# Patient Record
Sex: FEMALE | Race: WHITE | Employment: OTHER | URBAN - METROPOLITAN AREA
[De-identification: names, ages, dates, MRNs, and addresses within clinical notes are randomized per-mention and may not be internally consistent; named-entity substitution may affect disease eponyms.]

---

## 2017-01-18 PROBLEM — M48.061 SPINAL STENOSIS OF LUMBAR REGION WITH RADICULOPATHY: Status: ACTIVE | Noted: 2017-01-18

## 2017-01-18 PROBLEM — M54.16 SPINAL STENOSIS OF LUMBAR REGION WITH RADICULOPATHY: Status: ACTIVE | Noted: 2017-01-18

## 2017-03-07 PROCEDURE — 81001 URINALYSIS AUTO W/SCOPE: CPT | Performed by: INTERNAL MEDICINE

## 2017-03-07 PROCEDURE — 87081 CULTURE SCREEN ONLY: CPT | Performed by: INTERNAL MEDICINE

## 2017-03-07 PROCEDURE — 87086 URINE CULTURE/COLONY COUNT: CPT | Performed by: INTERNAL MEDICINE

## 2017-03-16 ENCOUNTER — APPOINTMENT (OUTPATIENT)
Dept: GENERAL RADIOLOGY | Facility: HOSPITAL | Age: 67
DRG: 520 | End: 2017-03-16
Attending: ORTHOPAEDIC SURGERY
Payer: MEDICARE

## 2017-03-16 ENCOUNTER — ANESTHESIA (OUTPATIENT)
Dept: SURGERY | Facility: HOSPITAL | Age: 67
DRG: 520 | End: 2017-03-16
Payer: MEDICARE

## 2017-03-16 ENCOUNTER — HOSPITAL ENCOUNTER (INPATIENT)
Facility: HOSPITAL | Age: 67
LOS: 1 days | Discharge: HOME | DRG: 520 | End: 2017-03-17
Attending: ORTHOPAEDIC SURGERY | Admitting: ORTHOPAEDIC SURGERY
Payer: MEDICARE

## 2017-03-16 ENCOUNTER — SURGERY (OUTPATIENT)
Age: 67
End: 2017-03-16

## 2017-03-16 ENCOUNTER — ANESTHESIA EVENT (OUTPATIENT)
Dept: SURGERY | Facility: HOSPITAL | Age: 67
DRG: 520 | End: 2017-03-16
Payer: MEDICARE

## 2017-03-16 DIAGNOSIS — M54.16 SPINAL STENOSIS OF LUMBAR REGION WITH RADICULOPATHY: Primary | ICD-10-CM

## 2017-03-16 DIAGNOSIS — M48.061 SPINAL STENOSIS OF LUMBAR REGION WITH RADICULOPATHY: Primary | ICD-10-CM

## 2017-03-16 LAB
ERYTHROCYTE [DISTWIDTH] IN BLOOD BY AUTOMATED COUNT: 15 % (ref 11–15)
GLUCOSE BLDC GLUCOMTR-MCNC: 116 MG/DL (ref 70–99)
GLUCOSE BLDC GLUCOMTR-MCNC: 121 MG/DL (ref 70–99)
GLUCOSE BLDC GLUCOMTR-MCNC: 124 MG/DL (ref 70–99)
GLUCOSE BLDC GLUCOMTR-MCNC: 142 MG/DL (ref 70–99)
HCT VFR BLD AUTO: 42.8 % (ref 35–48)
HGB BLD-MCNC: 14.1 G/DL (ref 12–16)
MCH RBC QN AUTO: 30 PG (ref 27–32)
MCHC RBC AUTO-ENTMCNC: 32.9 G/DL (ref 32–37)
MCV RBC AUTO: 91.3 FL (ref 80–100)
PLATELET # BLD AUTO: 210 K/UL (ref 140–400)
PMV BLD AUTO: 7.7 FL (ref 7.4–10.3)
RBC # BLD AUTO: 4.69 M/UL (ref 3.7–5.4)
WBC # BLD AUTO: 9.3 K/UL (ref 4–11)

## 2017-03-16 PROCEDURE — 72100 X-RAY EXAM L-S SPINE 2/3 VWS: CPT

## 2017-03-16 PROCEDURE — 88311 DECALCIFY TISSUE: CPT | Performed by: ORTHOPAEDIC SURGERY

## 2017-03-16 PROCEDURE — 0SB20ZZ EXCISION OF LUMBAR VERTEBRAL DISC, OPEN APPROACH: ICD-10-PCS | Performed by: ORTHOPAEDIC SURGERY

## 2017-03-16 PROCEDURE — 01NB0ZZ RELEASE LUMBAR NERVE, OPEN APPROACH: ICD-10-PCS | Performed by: ORTHOPAEDIC SURGERY

## 2017-03-16 PROCEDURE — 82962 GLUCOSE BLOOD TEST: CPT

## 2017-03-16 PROCEDURE — 85027 COMPLETE CBC AUTOMATED: CPT | Performed by: PHYSICIAN ASSISTANT

## 2017-03-16 PROCEDURE — 88304 TISSUE EXAM BY PATHOLOGIST: CPT | Performed by: ORTHOPAEDIC SURGERY

## 2017-03-16 PROCEDURE — 76001 XR OR LUMBAR SPINE (2 VIEWS) WITH >60 MIN C-ARM  (CPT=76001/72100): CPT

## 2017-03-16 DEVICE — FLOSEAL SEALENT STERILE 10ML: Type: IMPLANTABLE DEVICE | Status: FUNCTIONAL

## 2017-03-16 RX ORDER — CLINDAMYCIN PHOSPHATE 150 MG/ML
INJECTION, SOLUTION INTRAVENOUS AS NEEDED
Status: DISCONTINUED | OUTPATIENT
Start: 2017-03-16 | End: 2017-03-16 | Stop reason: SURG

## 2017-03-16 RX ORDER — 0.9 % SODIUM CHLORIDE 0.9 %
VIAL (ML) INJECTION
Status: COMPLETED
Start: 2017-03-16 | End: 2017-03-16

## 2017-03-16 RX ORDER — BISACODYL 10 MG
10 SUPPOSITORY, RECTAL RECTAL
Status: DISCONTINUED | OUTPATIENT
Start: 2017-03-16 | End: 2017-03-17

## 2017-03-16 RX ORDER — NALOXONE HYDROCHLORIDE 0.4 MG/ML
80 INJECTION, SOLUTION INTRAMUSCULAR; INTRAVENOUS; SUBCUTANEOUS AS NEEDED
Status: DISCONTINUED | OUTPATIENT
Start: 2017-03-16 | End: 2017-03-16 | Stop reason: HOSPADM

## 2017-03-16 RX ORDER — DIPHENHYDRAMINE HYDROCHLORIDE 50 MG/ML
25 INJECTION INTRAMUSCULAR; INTRAVENOUS EVERY 4 HOURS PRN
Status: DISCONTINUED | OUTPATIENT
Start: 2017-03-16 | End: 2017-03-17

## 2017-03-16 RX ORDER — HALOPERIDOL 5 MG/ML
0.25 INJECTION INTRAMUSCULAR ONCE AS NEEDED
Status: DISCONTINUED | OUTPATIENT
Start: 2017-03-16 | End: 2017-03-16 | Stop reason: HOSPADM

## 2017-03-16 RX ORDER — SODIUM CHLORIDE, SODIUM LACTATE, POTASSIUM CHLORIDE, CALCIUM CHLORIDE 600; 310; 30; 20 MG/100ML; MG/100ML; MG/100ML; MG/100ML
INJECTION, SOLUTION INTRAVENOUS CONTINUOUS
Status: DISCONTINUED | OUTPATIENT
Start: 2017-03-16 | End: 2017-03-16

## 2017-03-16 RX ORDER — HYDROCODONE BITARTRATE AND ACETAMINOPHEN 10; 325 MG/1; MG/1
1 TABLET ORAL EVERY 6 HOURS PRN
Status: DISCONTINUED | OUTPATIENT
Start: 2017-03-16 | End: 2017-03-16

## 2017-03-16 RX ORDER — HYDROMORPHONE HYDROCHLORIDE 1 MG/ML
0.6 INJECTION, SOLUTION INTRAMUSCULAR; INTRAVENOUS; SUBCUTANEOUS EVERY 5 MIN PRN
Status: DISCONTINUED | OUTPATIENT
Start: 2017-03-16 | End: 2017-03-16 | Stop reason: HOSPADM

## 2017-03-16 RX ORDER — SENNOSIDES 8.6 MG
17.2 TABLET ORAL NIGHTLY
Status: DISCONTINUED | OUTPATIENT
Start: 2017-03-16 | End: 2017-03-17

## 2017-03-16 RX ORDER — MORPHINE SULFATE 4 MG/ML
4 INJECTION, SOLUTION INTRAMUSCULAR; INTRAVENOUS EVERY 10 MIN PRN
Status: DISCONTINUED | OUTPATIENT
Start: 2017-03-16 | End: 2017-03-16 | Stop reason: HOSPADM

## 2017-03-16 RX ORDER — ONDANSETRON 2 MG/ML
4 INJECTION INTRAMUSCULAR; INTRAVENOUS ONCE AS NEEDED
Status: DISCONTINUED | OUTPATIENT
Start: 2017-03-16 | End: 2017-03-16 | Stop reason: HOSPADM

## 2017-03-16 RX ORDER — PRAVASTATIN SODIUM 20 MG
20 TABLET ORAL NIGHTLY
Status: DISCONTINUED | OUTPATIENT
Start: 2017-03-16 | End: 2017-03-17

## 2017-03-16 RX ORDER — HYDROCODONE BITARTRATE AND ACETAMINOPHEN 10; 325 MG/1; MG/1
1 TABLET ORAL EVERY 6 HOURS PRN
Status: DISCONTINUED | OUTPATIENT
Start: 2017-03-16 | End: 2017-03-17

## 2017-03-16 RX ORDER — LIDOCAINE HYDROCHLORIDE 10 MG/ML
INJECTION, SOLUTION EPIDURAL; INFILTRATION; INTRACAUDAL; PERINEURAL AS NEEDED
Status: DISCONTINUED | OUTPATIENT
Start: 2017-03-16 | End: 2017-03-16 | Stop reason: SURG

## 2017-03-16 RX ORDER — HYDROCODONE BITARTRATE AND ACETAMINOPHEN 10; 325 MG/1; MG/1
2 TABLET ORAL EVERY 6 HOURS PRN
Status: DISCONTINUED | OUTPATIENT
Start: 2017-03-16 | End: 2017-03-16

## 2017-03-16 RX ORDER — DEXAMETHASONE SODIUM PHOSPHATE 4 MG/ML
VIAL (ML) INJECTION AS NEEDED
Status: DISCONTINUED | OUTPATIENT
Start: 2017-03-16 | End: 2017-03-16 | Stop reason: SURG

## 2017-03-16 RX ORDER — HYDROMORPHONE HYDROCHLORIDE 1 MG/ML
0.3 INJECTION, SOLUTION INTRAMUSCULAR; INTRAVENOUS; SUBCUTANEOUS ONCE
Status: COMPLETED | OUTPATIENT
Start: 2017-03-16 | End: 2017-03-16

## 2017-03-16 RX ORDER — MORPHINE SULFATE 10 MG/ML
6 INJECTION, SOLUTION INTRAMUSCULAR; INTRAVENOUS EVERY 10 MIN PRN
Status: DISCONTINUED | OUTPATIENT
Start: 2017-03-16 | End: 2017-03-16 | Stop reason: HOSPADM

## 2017-03-16 RX ORDER — DIPHENHYDRAMINE HCL 25 MG
25 CAPSULE ORAL EVERY 4 HOURS PRN
Status: DISCONTINUED | OUTPATIENT
Start: 2017-03-16 | End: 2017-03-17

## 2017-03-16 RX ORDER — SODIUM PHOSPHATE, DIBASIC AND SODIUM PHOSPHATE, MONOBASIC 7; 19 G/133ML; G/133ML
1 ENEMA RECTAL ONCE AS NEEDED
Status: ACTIVE | OUTPATIENT
Start: 2017-03-16 | End: 2017-03-16

## 2017-03-16 RX ORDER — DOCUSATE SODIUM 100 MG/1
100 CAPSULE, LIQUID FILLED ORAL 2 TIMES DAILY
Status: DISCONTINUED | OUTPATIENT
Start: 2017-03-16 | End: 2017-03-17

## 2017-03-16 RX ORDER — ROCURONIUM BROMIDE 10 MG/ML
INJECTION, SOLUTION INTRAVENOUS AS NEEDED
Status: DISCONTINUED | OUTPATIENT
Start: 2017-03-16 | End: 2017-03-16 | Stop reason: SURG

## 2017-03-16 RX ORDER — SUCCINYLCHOLINE CHLORIDE 20 MG/ML
INJECTION INTRAMUSCULAR; INTRAVENOUS AS NEEDED
Status: DISCONTINUED | OUTPATIENT
Start: 2017-03-16 | End: 2017-03-16 | Stop reason: SURG

## 2017-03-16 RX ORDER — MORPHINE SULFATE 2 MG/ML
2 INJECTION, SOLUTION INTRAMUSCULAR; INTRAVENOUS EVERY 10 MIN PRN
Status: DISCONTINUED | OUTPATIENT
Start: 2017-03-16 | End: 2017-03-16 | Stop reason: HOSPADM

## 2017-03-16 RX ORDER — PHENYLEPHRINE HCL 10 MG/ML
VIAL (ML) INJECTION AS NEEDED
Status: DISCONTINUED | OUTPATIENT
Start: 2017-03-16 | End: 2017-03-16 | Stop reason: SURG

## 2017-03-16 RX ORDER — ONDANSETRON 2 MG/ML
INJECTION INTRAMUSCULAR; INTRAVENOUS AS NEEDED
Status: DISCONTINUED | OUTPATIENT
Start: 2017-03-16 | End: 2017-03-16 | Stop reason: SURG

## 2017-03-16 RX ORDER — ONDANSETRON 2 MG/ML
4 INJECTION INTRAMUSCULAR; INTRAVENOUS EVERY 4 HOURS PRN
Status: DISCONTINUED | OUTPATIENT
Start: 2017-03-16 | End: 2017-03-17

## 2017-03-16 RX ORDER — CLINDAMYCIN PHOSPHATE 900 MG/50ML
900 INJECTION INTRAVENOUS ONCE
Status: DISCONTINUED | OUTPATIENT
Start: 2017-03-16 | End: 2017-03-16 | Stop reason: HOSPADM

## 2017-03-16 RX ORDER — METOCLOPRAMIDE HYDROCHLORIDE 5 MG/ML
10 INJECTION INTRAMUSCULAR; INTRAVENOUS EVERY 6 HOURS PRN
Status: DISCONTINUED | OUTPATIENT
Start: 2017-03-16 | End: 2017-03-17

## 2017-03-16 RX ORDER — POLYETHYLENE GLYCOL 3350 17 G/17G
17 POWDER, FOR SOLUTION ORAL DAILY PRN
Status: DISCONTINUED | OUTPATIENT
Start: 2017-03-16 | End: 2017-03-17

## 2017-03-16 RX ORDER — HYDROCODONE BITARTRATE AND ACETAMINOPHEN 5; 325 MG/1; MG/1
1 TABLET ORAL AS NEEDED
Status: DISCONTINUED | OUTPATIENT
Start: 2017-03-16 | End: 2017-03-16 | Stop reason: HOSPADM

## 2017-03-16 RX ORDER — HYDROCODONE BITARTRATE AND ACETAMINOPHEN 7.5; 325 MG/1; MG/1
1 TABLET ORAL EVERY 4 HOURS PRN
Status: DISCONTINUED | OUTPATIENT
Start: 2017-03-16 | End: 2017-03-17

## 2017-03-16 RX ORDER — HYDROMORPHONE HYDROCHLORIDE 1 MG/ML
0.4 INJECTION, SOLUTION INTRAMUSCULAR; INTRAVENOUS; SUBCUTANEOUS EVERY 5 MIN PRN
Status: DISCONTINUED | OUTPATIENT
Start: 2017-03-16 | End: 2017-03-16 | Stop reason: HOSPADM

## 2017-03-16 RX ORDER — SODIUM CHLORIDE, SODIUM LACTATE, POTASSIUM CHLORIDE, CALCIUM CHLORIDE 600; 310; 30; 20 MG/100ML; MG/100ML; MG/100ML; MG/100ML
INJECTION, SOLUTION INTRAVENOUS CONTINUOUS
Status: DISCONTINUED | OUTPATIENT
Start: 2017-03-16 | End: 2017-03-17

## 2017-03-16 RX ORDER — ACETAMINOPHEN 325 MG/1
650 TABLET ORAL ONCE
Status: DISCONTINUED | OUTPATIENT
Start: 2017-03-16 | End: 2017-03-16 | Stop reason: HOSPADM

## 2017-03-16 RX ORDER — DEXAMETHASONE SODIUM PHOSPHATE 4 MG/ML
10 VIAL (ML) INJECTION ONCE
Status: COMPLETED | OUTPATIENT
Start: 2017-03-16 | End: 2017-03-16

## 2017-03-16 RX ORDER — MIDAZOLAM HYDROCHLORIDE 1 MG/ML
INJECTION INTRAMUSCULAR; INTRAVENOUS AS NEEDED
Status: DISCONTINUED | OUTPATIENT
Start: 2017-03-16 | End: 2017-03-16 | Stop reason: SURG

## 2017-03-16 RX ORDER — FAMOTIDINE 20 MG/1
20 TABLET ORAL ONCE
Status: DISCONTINUED | OUTPATIENT
Start: 2017-03-16 | End: 2017-03-16 | Stop reason: HOSPADM

## 2017-03-16 RX ORDER — CYCLOBENZAPRINE HCL 10 MG
10 TABLET ORAL EVERY 8 HOURS PRN
Status: DISCONTINUED | OUTPATIENT
Start: 2017-03-16 | End: 2017-03-17

## 2017-03-16 RX ORDER — BUPIVACAINE HYDROCHLORIDE AND EPINEPHRINE 5; 5 MG/ML; UG/ML
INJECTION, SOLUTION PERINEURAL AS NEEDED
Status: DISCONTINUED | OUTPATIENT
Start: 2017-03-16 | End: 2017-03-16 | Stop reason: HOSPADM

## 2017-03-16 RX ORDER — HYDROMORPHONE HYDROCHLORIDE 1 MG/ML
0.2 INJECTION, SOLUTION INTRAMUSCULAR; INTRAVENOUS; SUBCUTANEOUS EVERY 5 MIN PRN
Status: DISCONTINUED | OUTPATIENT
Start: 2017-03-16 | End: 2017-03-16 | Stop reason: HOSPADM

## 2017-03-16 RX ORDER — METOCLOPRAMIDE 10 MG/1
10 TABLET ORAL ONCE
Status: DISCONTINUED | OUTPATIENT
Start: 2017-03-16 | End: 2017-03-16 | Stop reason: HOSPADM

## 2017-03-16 RX ORDER — HYDROCODONE BITARTRATE AND ACETAMINOPHEN 10; 325 MG/1; MG/1
2 TABLET ORAL EVERY 6 HOURS PRN
Status: DISCONTINUED | OUTPATIENT
Start: 2017-03-16 | End: 2017-03-17

## 2017-03-16 RX ORDER — GLYCOPYRROLATE 0.2 MG/ML
INJECTION INTRAMUSCULAR; INTRAVENOUS AS NEEDED
Status: DISCONTINUED | OUTPATIENT
Start: 2017-03-16 | End: 2017-03-16 | Stop reason: SURG

## 2017-03-16 RX ORDER — HYDROCODONE BITARTRATE AND ACETAMINOPHEN 5; 325 MG/1; MG/1
2 TABLET ORAL AS NEEDED
Status: DISCONTINUED | OUTPATIENT
Start: 2017-03-16 | End: 2017-03-16 | Stop reason: HOSPADM

## 2017-03-16 RX ADMIN — ONDANSETRON 4 MG: 2 INJECTION INTRAMUSCULAR; INTRAVENOUS at 08:38:00

## 2017-03-16 RX ADMIN — ROCURONIUM BROMIDE 5 MG: 10 INJECTION, SOLUTION INTRAVENOUS at 07:53:00

## 2017-03-16 RX ADMIN — CLINDAMYCIN PHOSPHATE 900 MG: 150 INJECTION, SOLUTION INTRAVENOUS at 08:02:00

## 2017-03-16 RX ADMIN — GLYCOPYRROLATE 0.1 MG: 0.2 INJECTION INTRAMUSCULAR; INTRAVENOUS at 07:53:00

## 2017-03-16 RX ADMIN — PHENYLEPHRINE HCL 100 MCG: 10 MG/ML VIAL (ML) INJECTION at 08:48:00

## 2017-03-16 RX ADMIN — SODIUM CHLORIDE, SODIUM LACTATE, POTASSIUM CHLORIDE, CALCIUM CHLORIDE: 600; 310; 30; 20 INJECTION, SOLUTION INTRAVENOUS at 07:48:00

## 2017-03-16 RX ADMIN — MIDAZOLAM HYDROCHLORIDE 1 MG: 1 INJECTION INTRAMUSCULAR; INTRAVENOUS at 07:49:00

## 2017-03-16 RX ADMIN — SUCCINYLCHOLINE CHLORIDE 80 MG: 20 INJECTION INTRAMUSCULAR; INTRAVENOUS at 07:53:00

## 2017-03-16 RX ADMIN — PHENYLEPHRINE HCL 100 MCG: 10 MG/ML VIAL (ML) INJECTION at 08:37:00

## 2017-03-16 RX ADMIN — LIDOCAINE HYDROCHLORIDE 30 MG: 10 INJECTION, SOLUTION EPIDURAL; INFILTRATION; INTRACAUDAL; PERINEURAL at 07:53:00

## 2017-03-16 RX ADMIN — DEXAMETHASONE SODIUM PHOSPHATE 4 MG: 4 MG/ML VIAL (ML) INJECTION at 08:00:00

## 2017-03-16 RX ADMIN — SODIUM CHLORIDE, SODIUM LACTATE, POTASSIUM CHLORIDE, CALCIUM CHLORIDE: 600; 310; 30; 20 INJECTION, SOLUTION INTRAVENOUS at 09:25:00

## 2017-03-16 NOTE — H&P
Interm H & P   No changes from H& P dated from 3/15/17.   1 level Laminectomy, L4-L5 for neurogenic claudication.      Maureen Sanders PA-C

## 2017-03-16 NOTE — ANESTHESIA PREPROCEDURE EVALUATION
Anesthesia PreOp Note    HPI:     Nga Comer is a 77year old female who presents for preoperative consultation requested by: Vivienne Carpenter MD    Date of Surgery: 3/16/2017    Procedure(s):  LUMBAR LAMINECTOMY 1 LEVEL  Indication: disc herniat Fluticasone-Salmeterol (ADVAIR HFA) 230-21 MCG/ACT Inhalation Aerosol Inhale 2 puffs into the lungs 2 (two) times daily.  Disp: 3 Inhaler Rfl: 1 Unknown at Unknown time   Mometasone Furoate (ELOCON) 0.1 % Apply Externally Cream apply daily prn Disp: 1 Tub 25.00 Years     Types: Cigarettes    Smokeless tobacco: Never Used    Comment: one every 2 - 3 weeks      Alcohol Use: Yes  0.0 oz/week    0 Standard drinks or equivalent per week         Comment: not much-socially once or twice every 2 yrs    Drug Use: No complications, and any alternative forms of anesthetic management. All of the patient's questions were answered to the best of my ability. The patient desires the anesthetic management as planned.   Stella Molina  3/16/2017 7:03 AM

## 2017-03-16 NOTE — OPERATIVE REPORT
Hospital  Operative Report    [facility] Patient Status:  [facility] Outpatient Surgery    6/15/1950 MRN [id number]   Location [address] [facility] [address] UNIT Attending Hernando Caldwell MD   Hosp Day # 0 PCP Moon Randall MD midline incision. Dissection through the subcutaneous tissue exposed the fascia, incised the fascia longitudinally. Denuded the posterior elements of L4-5 and placed a deep retractor. We irrigated, obtained lateral fluoroscopy, confirmed the level again.  Angelita Patel AM

## 2017-03-16 NOTE — ANESTHESIA POSTPROCEDURE EVALUATION
Patient: Shannan Ribera    Procedure Summary     Date Anesthesia Start Anesthesia Stop Room / Location    03/16/17 0747 0940 300 [facility] MAIN OR 09 / 300 [facility] MAIN OR       Procedure Diagnosis Surgeon Responsible Provider    LUMBAR LAMINECTOMY 1 LEVEL (N/A ) (disc

## 2017-03-16 NOTE — H&P
[facility]    PATIENT'S NAME: Chepe Sher   ATTENDING PHYSICIAN: Wale Cobian MD   PATIENT ACCOUNT#:   [de-identified]    LOCATION:  [address] [place] [address]. [address] RECORD #:   [id number]       YOB: 1950  ADMISSION DATE:       03/ tested. NEUROLOGIC:  Cranial nerves are intact. The patient answers simple questions appropriately. DIAGNOSTIC IMPRESSION:    1. Lumbar spinal stenosis. 2.   L5 radiculopathy. 3.   Obesity. 4.   Diabetes. 5.   Dyslipidemia.     PLAN:  Careful med

## 2017-03-17 VITALS
BODY MASS INDEX: 20.57 KG/M2 | HEIGHT: 66 IN | DIASTOLIC BLOOD PRESSURE: 46 MMHG | SYSTOLIC BLOOD PRESSURE: 91 MMHG | HEART RATE: 64 BPM | RESPIRATION RATE: 18 BRPM | OXYGEN SATURATION: 93 % | WEIGHT: 128 LBS | TEMPERATURE: 99 F

## 2017-03-17 LAB
ERYTHROCYTE [DISTWIDTH] IN BLOOD BY AUTOMATED COUNT: 15 % (ref 11–15)
GLUCOSE BLDC GLUCOMTR-MCNC: 180 MG/DL (ref 70–99)
HBA1C MFR BLD: 5.8 % (ref 4–6)
HCT VFR BLD AUTO: 41.6 % (ref 35–48)
HGB BLD-MCNC: 13.6 G/DL (ref 12–16)
MCH RBC QN AUTO: 30 PG (ref 27–32)
MCHC RBC AUTO-ENTMCNC: 32.8 G/DL (ref 32–37)
MCV RBC AUTO: 91.4 FL (ref 80–100)
PLATELET # BLD AUTO: 203 K/UL (ref 140–400)
PMV BLD AUTO: 7.9 FL (ref 7.4–10.3)
RBC # BLD AUTO: 4.55 M/UL (ref 3.7–5.4)
WBC # BLD AUTO: 11.7 K/UL (ref 4–11)

## 2017-03-17 PROCEDURE — 97161 PT EVAL LOW COMPLEX 20 MIN: CPT

## 2017-03-17 PROCEDURE — 97165 OT EVAL LOW COMPLEX 30 MIN: CPT

## 2017-03-17 PROCEDURE — 97530 THERAPEUTIC ACTIVITIES: CPT

## 2017-03-17 PROCEDURE — 85027 COMPLETE CBC AUTOMATED: CPT | Performed by: PHYSICIAN ASSISTANT

## 2017-03-17 PROCEDURE — 82962 GLUCOSE BLOOD TEST: CPT

## 2017-03-17 PROCEDURE — 83036 HEMOGLOBIN GLYCOSYLATED A1C: CPT | Performed by: ORTHOPAEDIC SURGERY

## 2017-03-17 RX ORDER — CYCLOBENZAPRINE HCL 10 MG
10 TABLET ORAL 3 TIMES DAILY PRN
Qty: 90 TABLET | Refills: 0 | Status: SHIPPED | OUTPATIENT
Start: 2017-03-17 | End: 2017-05-01

## 2017-03-17 NOTE — PROGRESS NOTES
[facility]    Progress Note    Paco Ariza Patient Status:  Inpatient    6/15/1950 MRN [id number]   Location [facility] 4W/SW/SE Attending Mario Petty MD   Hosp Day # 1 PCP Akil Cleaning MD       SUBJECTIVE:  No (DULCOLAX) rectal suppository 10 mg 10 mg Rectal Daily PRN   ondansetron HCl (ZOFRAN) injection 4 mg 4 mg Intravenous Q4H PRN   Metoclopramide HCl (REGLAN) injection 10 mg 10 mg Intravenous Q6H PRN   lactated ringers infusion  Intravenous Continuous   Flut

## 2017-03-17 NOTE — RESPIRATORY THERAPY NOTE
Pt refused Incentive Spirometry and  BREO MDI. Pt dressed and ready to [facility] home, states she is leaving 1 hour and does not feel the need for them. Explained benefits and still refused. Pt uses Advair at home and has her Advair MDI with her at this time.

## 2017-03-17 NOTE — PLAN OF CARE
DISCHARGE PLANNING    • Discharge to home or other facility with appropriate resources Progressing        Diabetes/Glucose Control    • Glucose maintained within prescribed range Progressing        MUSCULOSKELETAL - ADULT    • Return mobility to safest lev

## 2017-03-17 NOTE — OCCUPATIONAL THERAPY NOTE
OCCUPATIONAL THERAPY QUICK EVALUATION - INPATIENT    Room Number: 469/627-L  Evaluation Date: 3/17/2017     Type of Evaluation: Initial       Physician Order: IP Consult to Occupational Therapy  Reason for Therapy:  ADL/IADL Dysfunction and Discharge Plann OBJECTIVE  Precautions: Spine  Fall Risk: Standard fall risk    WEIGHT BEARING RESTRICTION  Weight Bearing Restriction: None       PAIN ASSESSMENT  Ratin          COGNITION  Alert and oriented x 3 - motivated and able to recall all spinal prec. -        OT Device Recommendations: Shower chair    PLAN  Patient has been evaluated and presents with no skilled Occupational Therapy needs at this time. Patient discharged from Occupational Therapy services.   Please re-order if a new functional limitati

## 2017-03-17 NOTE — PHYSICAL THERAPY NOTE
PHYSICAL THERAPY EVALUATION - INPATIENT     Room Number: 416/416-A  Evaluation Date: 3/17/2017  Type of Evaluation: Initial  Physician Order: PT Eval and Treat    Presenting Problem: spinal stenosis  Reason for Therapy: Mobility Dysfunction and Dischar stenosis who presented to the hospital for a L4/5 laminectomy and miscrodiscetomy.      Problem List  Active Problems:    Spinal stenosis of lumbar region with radiculopathy      Past Medical History  Past Medical History   Diagnosis Date   • DIABETES    • within functional limits     Lower extremity strength is within functional limits     BALANCE  Static Sitting: Normal  Dynamic Sitting: Normal  Static Standing: Good  Dynamic Standing: Good    ADDITIONAL TESTS  Additional Tests: None       NEUROLOGICAL FIN independent with cane - straight     Goal #2  Current Status    Goal #3 Patient is able to ambulate 300 feet with assist device: cane - straight at assistance level: modified independent   Goal #3   Current Status    Goal #4 Patient will negotiate 12 stair

## 2017-03-17 NOTE — DISCHARGE SUMMARY
Discharge Summary  Patient ID:  Yecenia Forde  [id number]  42 year old  6/15/1950    Admit date: 3/16/2017    Discharge date and time: 3/17/17    Attending Physician: Kevon Bhandari MD     Reason for admission: post-op    Discharge Diagnoses: disc not apply Kit  SMBG BID-TID  Qty: 1 Kit Refills: 0    Blood Glucose Calibration (ACCU-CHEK ERMELINDA) In Vitro Solution  Test with each new set of strips  Qty: 1 Each Refills: 3      STOP taking these medications    hydrocodone-acetaminophen 7.5-325 MG Oral Ta

## 2017-03-17 NOTE — PLAN OF CARE
Naomie Found said that norco was not giving her the pain relief she needed. Called MD, got order for one dose of dilaudid and it decreased her pain and made her much more comfortable so she could sleep. DC plan to home today.

## 2017-03-18 ENCOUNTER — TELEPHONE (OUTPATIENT)
Dept: CARDIOLOGY UNIT | Facility: HOSPITAL | Age: 67
End: 2017-03-18

## 2017-03-18 NOTE — DISCHARGE SUMMARY
[facility]    PATIENT'S NAME: Radhancmoris Councilman   ATTENDING PHYSICIAN: Ruperto Garsia MD   PATIENT ACCOUNT#:   [de-identified]    LOCATION:  [address] [place] [address] RECORD #:   [id number]       YOB: 1950  ADMISSION DATE:       03/

## 2017-03-19 ENCOUNTER — TELEPHONE (OUTPATIENT)
Dept: MEDSURG UNIT | Facility: HOSPITAL | Age: 67
End: 2017-03-19

## 2017-11-01 PROBLEM — E11.9 DIABETES MELLITUS TYPE 2 WITHOUT RETINOPATHY (HCC): Status: ACTIVE | Noted: 2017-11-01

## 2017-11-01 PROBLEM — H25.811 COMBINED FORMS OF AGE-RELATED CATARACT OF RIGHT EYE: Status: ACTIVE | Noted: 2017-11-01

## 2017-11-01 PROBLEM — Z96.1 PSEUDOPHAKIA: Status: ACTIVE | Noted: 2017-11-01

## 2017-11-01 PROBLEM — H35.433 PAVING STONE RETINAL DEGENERATION OF BOTH EYES: Status: ACTIVE | Noted: 2017-11-01

## 2017-11-01 PROCEDURE — 82570 ASSAY OF URINE CREATININE: CPT | Performed by: INTERNAL MEDICINE

## 2017-11-01 PROCEDURE — 82043 UR ALBUMIN QUANTITATIVE: CPT | Performed by: INTERNAL MEDICINE

## 2018-04-09 PROBLEM — Z96.1 PSEUDOPHAKIA: Status: RESOLVED | Noted: 2017-11-01 | Resolved: 2018-04-09

## 2018-04-09 PROCEDURE — 82570 ASSAY OF URINE CREATININE: CPT | Performed by: INTERNAL MEDICINE

## 2018-04-09 PROCEDURE — 82043 UR ALBUMIN QUANTITATIVE: CPT | Performed by: INTERNAL MEDICINE

## 2018-10-26 PROBLEM — M48.061 SPINAL STENOSIS OF LUMBAR REGION WITH RADICULOPATHY: Status: RESOLVED | Noted: 2017-01-18 | Resolved: 2018-10-26

## 2018-10-26 PROBLEM — M54.16 SPINAL STENOSIS OF LUMBAR REGION WITH RADICULOPATHY: Status: RESOLVED | Noted: 2017-01-18 | Resolved: 2018-10-26

## 2019-04-15 PROBLEM — H25.811 COMBINED FORMS OF AGE-RELATED CATARACT OF RIGHT EYE: Status: RESOLVED | Noted: 2017-11-01 | Resolved: 2019-04-15

## 2020-09-22 PROBLEM — J44.1 BRONCHITIS, CHRONIC OBSTRUCTIVE, WITH EXACERBATION (HCC): Status: ACTIVE | Noted: 2020-09-22

## 2020-11-09 PROBLEM — H35.433 PAVING STONE RETINAL DEGENERATION OF BOTH EYES: Status: RESOLVED | Noted: 2017-11-01 | Resolved: 2020-11-09

## 2020-11-09 PROBLEM — J44.1 BRONCHITIS, CHRONIC OBSTRUCTIVE, WITH EXACERBATION (HCC): Status: RESOLVED | Noted: 2020-09-22 | Resolved: 2020-11-09

## 2020-11-09 PROBLEM — J41.0 SIMPLE CHRONIC BRONCHITIS (HCC): Status: ACTIVE | Noted: 2020-11-09

## 2021-04-11 DIAGNOSIS — Z23 NEED FOR VACCINATION: ICD-10-CM

## 2021-05-02 PROBLEM — J44.9 CHRONIC OBSTRUCTIVE PULMONARY DISEASE, UNSPECIFIED COPD TYPE (HCC): Status: ACTIVE | Noted: 2021-05-02

## 2021-12-19 PROBLEM — F17.210 CIGARETTE SMOKER: Status: ACTIVE | Noted: 2021-12-19

## 2021-12-19 PROBLEM — G89.29 CHRONIC MIDLINE LOW BACK PAIN WITHOUT SCIATICA: Status: ACTIVE | Noted: 2021-12-19

## 2021-12-19 PROBLEM — M54.50 CHRONIC MIDLINE LOW BACK PAIN WITHOUT SCIATICA: Status: ACTIVE | Noted: 2021-12-19

## 2021-12-19 PROBLEM — F51.01 PRIMARY INSOMNIA: Status: ACTIVE | Noted: 2021-12-19

## 2023-10-12 NOTE — H&P
Detail Level: Detailed Spine Surgery H&P  Dr. Shorty Guerrero Patient Status:  Hospital Outpatient Surgery    6/15/1950 MRN [id number]   Location [place] [address] Attending Nani Guadarrama MD   Hosp Day #  PCP Ayala Escobar MD     Subject NIDDM          Past Surgical History     OTHER SURGICAL HISTORY       Comment  breast reduction     COLONOSCOPY   10/20/03= Diverticulosis, Polyp (TA)     REDUCTION RIGHT   2002     REDUCTION LEFT   2002     X-RAY COLON CONTRAST   9/30/13= Diverticulosis apply daily prn  Disp: 1 Tube  Rfl: 6    Glucose Blood (ACCU-CHEK ERMELINDA) In Vitro Strip  Use daily  Disp: 100 strip  Rfl: 3    ACCU-CHEK MULTICLIX LANCETS Does not apply Misc  Use daily  Disp: 102 each  Rfl: 6    Blood Glucose Monitoring Suppl (ACCU-CHEK menstrual period 03/14/2005. Intake/Output:  No intake or output data in the 24 hours ending 03/15/17 1502      Neck:  No tenderness to palpitation.   Full range of motion to flexion and extension, lateral rotation   and lateral flexion of cervical Assessment/Plan:    neurogenic claudication, L4-L5 lumbar radiculopathy     Procedure(s):  LUMBAR LAMINECTOMY 1 LEVEL on (Not on file)  Patient Active Problem List:     Hyperlipemia     Diabetes mellitus ([facility] [address]     Dermatitis     Obesity     Asthma

## (undated) DEVICE — STERILE LATEX POWDER-FREE SURGICAL GLOVESWITH NITRILE COATING: Brand: PROTEXIS

## (undated) DEVICE — VIOLET BRAIDED (POLYGLACTIN 910), SYNTHETIC ABSORBABLE SUTURE: Brand: COATED VICRYL

## (undated) DEVICE — STERILE POLYISOPRENE POWDER-FREE SURGICAL GLOVES: Brand: PROTEXIS

## (undated) DEVICE — 3M(TM) TEGADERM(TM) TRANSPARENT FILM DRESSING FRAME STYLE 1628: Brand: 3M™ TEGADERM™

## (undated) DEVICE — GAUZE SPONGES,12 PLY: Brand: CURITY

## (undated) DEVICE — Device

## (undated) DEVICE — SUTURE MONOCRYL 3-0 Y936H

## (undated) DEVICE — SOLUTION SURG DURA PREP HAZMAT

## (undated) DEVICE — FLOSEAL SEALENT STERILE 10ML

## (undated) DEVICE — SPONGE LAP 4X18

## (undated) DEVICE — LAMINECTOMY: Brand: MEDLINE INDUSTRIES, INC.

## (undated) DEVICE — OIL CARTRIDGE: Brand: CORE, MAESTRO

## (undated) DEVICE — DRAPE C-ARM UNIVERSAL

## (undated) DEVICE — CUSHION INSERT PRONEVIEW LG

## (undated) DEVICE — NEEDLE SPINAL 18X3-1/2 PINK.

## (undated) DEVICE — DIFFUSER: Brand: CORE, MAESTRO

## (undated) DEVICE — SUTURE VICRYL 2-0 CT-1

## (undated) DEVICE — CONTAINER CLIKSEAL PP 4OZ BLU

## (undated) DEVICE — SOL  .9 1000ML BTL

## (undated) NOTE — IP AVS SNAPSHOT
[address]  [address] [phone]                Discharge Summary   3/16/2017    Yokasta Hoang           Admission Information        Provider Department    3/16/2017 Kimi Casarez MD [facility] Take 1 tablet by mouth every 6 (six) hours as needed for Pain.    Stop taking on:  3/17/2017    Michael NAQVI                    TAKE ONE TAB EVERY 6 HOURS AS NEEDED FOR PAIN         CONTINUE taking these medications        Instructions Authorizing Pro increased redness, swelling, foul smelling drainage, or fever of 100.5 or greater  · No heavy lifting, bending, or twisting      30 Days    After Your Surgery    [facility]'s commitment to quality care does not end  when you leave the hospital    We Mar 31, 2017  1:00 PM   POSTOP with Kajal Disla [facility] AT [facility])    [address] [address]   [phone]              Immunization History as of 3/17/2017  Never Reviewe - If you are a smoker or have smoked in the last 12 months, we encourage you to explore options for quitting.     - If you have concerns related to behavioral health issues or thoughts of harming yourself, contact [facility] a your medications while at home.          Cholesterol Lowering Medications     LOVASTATIN 40 MG Oral Tab       Use: Lower cholesterol, protect your heart   Most common side effects: Dizziness, constipation, abnormal liver function   What to report to your he Blood Glucose Monitoring Suppl (ACCU-CHEK ERMELINDA) Does not apply Kit    Blood Glucose Calibration (ACCU-CHEK ERMELINDA) In Vitro Solution